# Patient Record
Sex: FEMALE | NOT HISPANIC OR LATINO | Employment: FULL TIME
[De-identification: names, ages, dates, MRNs, and addresses within clinical notes are randomized per-mention and may not be internally consistent; named-entity substitution may affect disease eponyms.]

---

## 2017-08-05 ENCOUNTER — HEALTH MAINTENANCE LETTER (OUTPATIENT)
Age: 47
End: 2017-08-05

## 2018-08-12 ENCOUNTER — HEALTH MAINTENANCE LETTER (OUTPATIENT)
Age: 48
End: 2018-08-12

## 2019-07-17 ENCOUNTER — RECORDS - HEALTHEAST (OUTPATIENT)
Dept: ADMINISTRATIVE | Facility: OTHER | Age: 49
End: 2019-07-17

## 2019-08-01 ENCOUNTER — RECORDS - HEALTHEAST (OUTPATIENT)
Dept: ADMINISTRATIVE | Facility: OTHER | Age: 49
End: 2019-08-01

## 2019-08-13 ENCOUNTER — RECORDS - HEALTHEAST (OUTPATIENT)
Dept: RADIOLOGY | Facility: CLINIC | Age: 49
End: 2019-08-13

## 2019-08-13 ENCOUNTER — RECORDS - HEALTHEAST (OUTPATIENT)
Dept: ADMINISTRATIVE | Facility: OTHER | Age: 49
End: 2019-08-13

## 2019-08-27 ENCOUNTER — HOSPITAL ENCOUNTER (OUTPATIENT)
Dept: SURGERY | Facility: CLINIC | Age: 49
Discharge: HOME | End: 2019-08-27
Attending: SPECIALIST

## 2019-08-27 ENCOUNTER — COMMUNICATION - HEALTHEAST (OUTPATIENT)
Dept: SURGERY | Facility: CLINIC | Age: 49
End: 2019-08-27

## 2019-08-28 ENCOUNTER — COMMUNICATION - HEALTHEAST (OUTPATIENT)
Dept: SURGERY | Facility: CLINIC | Age: 49
End: 2019-08-28

## 2019-08-30 ENCOUNTER — COMMUNICATION - HEALTHEAST (OUTPATIENT)
Dept: SURGERY | Facility: CLINIC | Age: 49
End: 2019-08-30

## 2019-09-03 ENCOUNTER — COMMUNICATION - HEALTHEAST (OUTPATIENT)
Dept: SURGERY | Facility: CLINIC | Age: 49
End: 2019-09-03

## 2019-09-11 ENCOUNTER — OFFICE VISIT (OUTPATIENT)
Dept: FAMILY MEDICINE | Facility: CLINIC | Age: 49
End: 2019-09-11
Payer: COMMERCIAL

## 2019-09-11 VITALS
WEIGHT: 189 LBS | BODY MASS INDEX: 25.6 KG/M2 | TEMPERATURE: 99 F | SYSTOLIC BLOOD PRESSURE: 124 MMHG | HEART RATE: 83 BPM | HEIGHT: 72 IN | DIASTOLIC BLOOD PRESSURE: 84 MMHG | OXYGEN SATURATION: 98 % | RESPIRATION RATE: 16 BRPM

## 2019-09-11 DIAGNOSIS — F34.1 DYSTHYMIA: ICD-10-CM

## 2019-09-11 DIAGNOSIS — F41.1 GAD (GENERALIZED ANXIETY DISORDER): Primary | ICD-10-CM

## 2019-09-11 PROCEDURE — 99214 OFFICE O/P EST MOD 30 MIN: CPT | Performed by: PHYSICIAN ASSISTANT

## 2019-09-11 RX ORDER — BUPROPION HYDROCHLORIDE 300 MG/1
300 TABLET ORAL DAILY
COMMUNITY
Start: 2019-08-16

## 2019-09-11 RX ORDER — FLUOXETINE 10 MG/1
CAPSULE ORAL
Qty: 60 CAPSULE | Refills: 1 | Status: SHIPPED | OUTPATIENT
Start: 2019-09-11 | End: 2019-10-17

## 2019-09-11 RX ORDER — LIOTHYRONINE SODIUM 5 UG/1
1 TABLET ORAL DAILY
COMMUNITY
Start: 2019-08-16

## 2019-09-11 RX ORDER — LEVOTHYROXINE SODIUM 112 UG/1
112 TABLET ORAL
COMMUNITY
Start: 2019-01-17

## 2019-09-11 ASSESSMENT — ANXIETY QUESTIONNAIRES
7. FEELING AFRAID AS IF SOMETHING AWFUL MIGHT HAPPEN: MORE THAN HALF THE DAYS
GAD7 TOTAL SCORE: 10
1. FEELING NERVOUS, ANXIOUS, OR ON EDGE: NEARLY EVERY DAY
2. NOT BEING ABLE TO STOP OR CONTROL WORRYING: SEVERAL DAYS
5. BEING SO RESTLESS THAT IT IS HARD TO SIT STILL: NOT AT ALL
6. BECOMING EASILY ANNOYED OR IRRITABLE: SEVERAL DAYS
3. WORRYING TOO MUCH ABOUT DIFFERENT THINGS: SEVERAL DAYS
IF YOU CHECKED OFF ANY PROBLEMS ON THIS QUESTIONNAIRE, HOW DIFFICULT HAVE THESE PROBLEMS MADE IT FOR YOU TO DO YOUR WORK, TAKE CARE OF THINGS AT HOME, OR GET ALONG WITH OTHER PEOPLE: VERY DIFFICULT

## 2019-09-11 ASSESSMENT — PATIENT HEALTH QUESTIONNAIRE - PHQ9
SUM OF ALL RESPONSES TO PHQ QUESTIONS 1-9: 10
5. POOR APPETITE OR OVEREATING: MORE THAN HALF THE DAYS

## 2019-09-11 ASSESSMENT — MIFFLIN-ST. JEOR: SCORE: 1608.17

## 2019-09-11 NOTE — PROGRESS NOTES
Subjective     Sheryl Cee is a 49 year old female who presents to clinic today for the following health issues:    HPI   Abnormal Mood Symptoms  Onset: 1.5 mos    Description:   Depression: YES  Anxiety: YES    Accompanying Signs & Symptoms:  Still participating in activities that you used to enjoy: no  Fatigue: yes  Irritability: no   Difficulty concentrating: YES  Changes in appetite: no   Problems with sleep: YES  Heart racing/beating fast : no   Thoughts of hurting yourself or others: none    History:   Recent stress: YES - recent diagnosis of breast cancer  Prior depression hospitalization: None  Family history of depression: YES  Family history of anxiety: YES    Precipitating factors:   Alcohol/drug use: no     Alleviating factors:  None    Therapies Tried and outcome: None      Patient Active Problem List   Diagnosis     Hypothyroidism     DEPRESSION     Tinnitus     CARDIOVASCULAR SCREENING; LDL GOAL LESS THAN 160     Vitamin D insufficiency     Irregular menses     Weight gain     Past Surgical History:   Procedure Laterality Date     SURGICAL HISTORY OF -   1999    Extraction of Elizabeth Teeth       Social History     Tobacco Use     Smoking status: Never Smoker     Smokeless tobacco: Never Used   Substance Use Topics     Alcohol use: Yes     Comment: social     Family History   Problem Relation Age of Onset     Breast Cancer Other      Diabetes Maternal Grandmother      Circulatory Maternal Grandfather         CHF     Cerebrovascular Disease Paternal Grandfather      Hypertension Brother         Stress related     C.A.D. No family hx of      Cancer - colorectal No family hx of      Prostate Cancer No family hx of          Current Outpatient Medications   Medication Sig Dispense Refill     buPROPion (WELLBUTRIN XL) 300 MG 24 hr tablet Take 300 mg by mouth daily       cholecalciferol (VITAMIN-D) 1000 UNIT capsule Take 1 capsule by mouth daily. 30 capsule 11     levothyroxine (SYNTHROID/LEVOTHROID) 112  "MCG tablet Take 112 mcg by mouth       liothyronine (CYTOMEL) 5 MCG tablet Take 1 tablet by mouth daily       cefUROXime (CEFTIN) 500 MG tablet Take 1 tablet (500 mg) by mouth 2 times daily (Patient not taking: Reported on 9/11/2019) 20 tablet 1     cholecalciferol 2000 units CAPS Take 2,000 Units by mouth       CITALOPRAM HYDROBROMIDE 10 MG PO TABS 1 TABLETS DAILY 30 0     guaiFENesin-codeine (ROBITUSSIN AC) 100-10 MG/5ML SOLN Take 10-15 mLs by mouth every 4 hours as needed for cough (Patient not taking: Reported on 9/11/2019) 240 mL 1     LEVOXYL 50 MCG OR TABS 1 TABLET DAILY       No Known Allergies  Recent Labs   Lab Test 10/06/15  0813 09/24/13  0709 02/13/12  1644   * 128  --    HDL 54 62  --    TRIG 119 86  --    CR  --   --  0.91   GFRESTIMATED  --   --  69   GFRESTBLACK  --   --  83   POTASSIUM  --   --  4.3   TSH 2.81  --  2.25      BP Readings from Last 3 Encounters:   09/11/19 124/84   10/02/15 128/72   03/31/14 121/75    Wt Readings from Last 3 Encounters:   09/11/19 85.7 kg (189 lb)   10/02/15 90.1 kg (198 lb 9.6 oz)   03/31/14 83.6 kg (184 lb 6.4 oz)                      Reviewed and updated as needed this visit by Provider         Review of Systems   ROS COMP: Constitutional, HEENT, cardiovascular, pulmonary, GI, , musculoskeletal, neuro, skin, endocrine and psych systems are negative, except as otherwise noted.      Objective    /84   Pulse 83   Temp 99  F (37.2  C) (Tympanic)   Resp 16   Ht 1.835 m (6' 0.24\")   Wt 85.7 kg (189 lb)   SpO2 98%   BMI 25.46 kg/m    Body mass index is 25.46 kg/m .  Physical Exam     Eye exam - right eye normal lid, conjunctiva, cornea, pupil and fundus, left eye normal lid, conjunctiva, cornea, pupil and fundus.  Thyroid not palpable, not enlarged, small left lobe nodules detected.  CHEST:chest clear to IPPA, no tachypnea, retractions or cyanosis and S1, S2 normal, no murmur, no gallop, rate regular.    Sheryl was seen today for anxiety and new " patient.    Diagnoses and all orders for this visit:    SHAISTA (generalized anxiety disorder)   FLUoxetine (PROZAC) 10 MG capsule; Take 1 capsule daily for 7 days, then increase to 2 capsules daily thereafter    Dysthymia   FLUoxetine (PROZAC) 10 MG capsule; Take 1 capsule daily for 7 days, then increase to 2 capsules daily thereafter    work on lifestyle modification  Recheck in 5 wks.

## 2019-09-12 ASSESSMENT — ANXIETY QUESTIONNAIRES: GAD7 TOTAL SCORE: 10

## 2019-10-15 DIAGNOSIS — F34.1 DYSTHYMIA: ICD-10-CM

## 2019-10-15 DIAGNOSIS — F41.1 GAD (GENERALIZED ANXIETY DISORDER): ICD-10-CM

## 2019-10-15 NOTE — TELEPHONE ENCOUNTER
Requested Prescriptions   Pending Prescriptions Disp Refills     FLUoxetine (PROZAC) 10 MG capsule 60 capsule 1     Sig: Take 1 capsule daily for 7 days, then increase to 2 capsules daily thereafter  Last Written Prescription Date:  9/11/19  Last Fill Quantity: 90,  # refills: 4   Last office visit: 9/11/2019 with prescribing provider:  XIN Ivan   Future Office Visit:         There is no refill protocol information for this order

## 2019-10-15 NOTE — LETTER
October 22, 2019        Sheryl Cee  [address]  [place] [address]      Dear Sheryl,    Your medication has been approved for FLUoxetine (PROZAC) 10 MG capsule for one month only.    However, you are due for a follow up appointment for further refills. Please schedule this visit at your earliest convenience.    Thank you.    Keron Ivan PA-C/rm

## 2019-10-17 NOTE — TELEPHONE ENCOUNTER
Routing refill request to provider for review/approval because:  Medication is reported/historical    Patient states that she has been taking Fluoxetine 20 mg (1 tablet daily) and this has been effective. No side effects reported.     Current Medication list shows only Fluoxetine 10 mg written (with directions to increase to 2 tablets to =20 mg).     She has 5 pills left today and uses a Mail Order pharmacy.    According to progress note, Keron Ivan PA-C wanted patient to follow up mid-October. His next opening is 10/24/19. Patient states that she is having a mastectomy that day & will schedule a follow up a few weeks after her surgery.     Tatiana Gallagher RN BSN

## 2019-12-27 DIAGNOSIS — F41.1 GAD (GENERALIZED ANXIETY DISORDER): ICD-10-CM

## 2019-12-27 DIAGNOSIS — F34.1 DYSTHYMIA: ICD-10-CM

## 2019-12-27 NOTE — TELEPHONE ENCOUNTER
"Requested Prescriptions   Pending Prescriptions Disp Refills     FLUoxetine (PROZAC) 20 MG capsule [Pharmacy Med Name: FLUOXETINE HCL CAPS 20MG] 90 capsule 4     Sig: TAKE 1 CAPSULE DAILY  Last Written Prescription Date:  10/17/19  Last Fill Quantity: 90,  # refills: 4   Last office visit: 9/11/2019 with prescribing provider:  XIN Ivan   Future Office Visit:         SSRIs Protocol Failed - 12/27/2019  8:40 AM        Failed - PHQ-9 score less than 5 in past 6 months     Please review last PHQ-9 score.           Passed - Medication is active on med list        Passed - Patient is age 18 or older        Passed - No active pregnancy on record        Passed - No positive pregnancy test in last 12 months        Passed - Recent (6 mo) or future (30 days) visit within the authorizing provider's specialty     Patient had office visit in the last 6 months or has a visit in the next 30 days with authorizing provider or within the authorizing provider's specialty.  See \"Patient Info\" tab in inbasket, or \"Choose Columns\" in Meds & Orders section of the refill encounter.            "

## 2019-12-29 NOTE — TELEPHONE ENCOUNTER
PHQ-9 SCORE 9/11/2019   PHQ-9 Total Score 10     Last OV with Keron: 9/11/19 with advised F/U in 5 weeks which patient did not complete.     Marci Coelho, RN, BSN

## 2020-02-16 ENCOUNTER — HEALTH MAINTENANCE LETTER (OUTPATIENT)
Age: 50
End: 2020-02-16

## 2020-11-13 ENCOUNTER — OFFICE VISIT (OUTPATIENT)
Dept: ORTHOPEDICS | Facility: CLINIC | Age: 50
End: 2020-11-13
Payer: COMMERCIAL

## 2020-11-13 ENCOUNTER — ANCILLARY PROCEDURE (OUTPATIENT)
Dept: GENERAL RADIOLOGY | Facility: CLINIC | Age: 50
End: 2020-11-13
Attending: PEDIATRICS
Payer: COMMERCIAL

## 2020-11-13 VITALS
SYSTOLIC BLOOD PRESSURE: 132 MMHG | WEIGHT: 175 LBS | HEIGHT: 72 IN | BODY MASS INDEX: 23.7 KG/M2 | DIASTOLIC BLOOD PRESSURE: 86 MMHG

## 2020-11-13 DIAGNOSIS — M25.562 CHRONIC PAIN OF LEFT KNEE: ICD-10-CM

## 2020-11-13 DIAGNOSIS — G89.29 CHRONIC PAIN OF LEFT KNEE: ICD-10-CM

## 2020-11-13 DIAGNOSIS — M25.562 CHRONIC PAIN OF LEFT KNEE: Primary | ICD-10-CM

## 2020-11-13 DIAGNOSIS — G89.29 CHRONIC PAIN OF LEFT KNEE: Primary | ICD-10-CM

## 2020-11-13 PROCEDURE — 99204 OFFICE O/P NEW MOD 45 MIN: CPT | Performed by: PEDIATRICS

## 2020-11-13 PROCEDURE — 73562 X-RAY EXAM OF KNEE 3: CPT | Mod: LT | Performed by: RADIOLOGY

## 2020-11-13 RX ORDER — ANASTROZOLE 1 MG/1
1 TABLET ORAL
COMMUNITY
Start: 2020-04-02

## 2020-11-13 ASSESSMENT — MIFFLIN-ST. JEOR: SCORE: 1535.79

## 2020-11-13 NOTE — PROGRESS NOTES
Sports Medicine Clinic Visit    PCP: Michele Valentine    Sheryl Cee is a 50 year old female who is seen  as a self referral presenting with left knee pain.    Injury: None  **  Symptoms intermittent; generally after more activity, e.g., stairs; sometimes if stepping wrong.   Gets some anterior soreness, some swelling.  Feels like gives way at times.   Feels like still able to be active.        Location of Pain: left anterior knee - around patella  Duration of Pain: 2 year(s), pain comes and goes  Rating of Pain at worst: 7/10  Rating of Pain Currently: 1/10  Pain is better with: Ice, Rest and Elevation  Pain is worse with: going up and down stairs, prolonged walking, running, carrying heavy items  Additional Features: cracking, occasional feeling of instability, occasional swelling  Other treatments so far consists of: Ice, Heat, Ibuprofen, Rest and Elevation  Prior History of related problems: none    Social History: works in office administrative support    Review of Systems  Skin: no+ bruising, + swelling  Musculoskeletal: as above  Neurologic: no numbness, paresthesias  Remainder of review of systems is negative including constitutional, CV, pulmonary, GI, except as noted in HPI or medical history.    The patient's past medical and surgical history, social history, family history, problem list, and medication list has been reviewed and is as noted above and in chart.    Objective  /86   Ht 1.829 m (6')   Wt 79.4 kg (175 lb)   BMI 23.73 kg/m      GENERAL APPEARANCE: healthy, alert and no distress   GAIT: NORMAL  SKIN: no suspicious lesions or rashes  NEURO: Normal strength and tone, mentation intact and speech normal  PSYCH:  mentation appears normal and affect normal/bright  HEENT: no scleral icterus  CV: distal perfusion intact  RESP: nonlabored breathing          Left Knee exam    ROM:      Full active and passive ROM with flexion and extension    Patellar Motion:      Crepitus noted in  the patellofemoral joint    Tender: none focal    Non Tender:       medial patellar border        lateral patellar border        medial joint line        lateral joint line        infrapatellar tendon        tibial tubercle       No pain with patellar translation    Special Tests:      neg (-) Tiki       neg (-) Lachman       neg (-) anterior drawer       neg (-) posterior drawer       neg (-) varus       neg (-) valgus       no pain with forced extension    Evaluation of ipsilateral kinetic chain:        Increased anterior knee motion with mini squat, single leg squat  Crepitus left knee with above motions  Some pain left with above motions    Radiology  Visualized radiographs as noted below, and reviewed the images with the patient; if the report was available at the time of the visit, the report was reviewed as well.  No acute abnormality. Joint spaces appear fairly well preserved.      Recent Results (from the past 24 hour(s))   XR Knee Standing AP Bilat Joshua Bilat Lat Left    Narrative    KNEE STANDING AP BILATERAL SUNRISE BILATERAL LATERAL LEFT 11/13/2020  11:21 AM     HISTORY: Chronic pain of left knee.    COMPARISON: None.      Impression    IMPRESSION: Three views of the left knee show no bony or soft tissue  abnormality. No joint space effusion. Two views of the right knee are  included and are unremarkable.         Assessment:  1. Chronic pain of left knee        Plan:  Discussed the assessment with the patient.  See patient instructions. Offered PT, even just one visit. She primarily prefers to monitor for now, though can contact her relative who is a PT.  Follow up: will leave open ended.  Questions answered. Discussed signs and symptoms that may indicate more serious issues; the patient was instructed to seek appropriate care if noted. Sheryl indicates understanding of these issues and agrees with the plan.      Torres Rader DO, CAQ          Patient Instructions   Left knee pain is from the  patellofemoral compartment, patellofemoral pain.  With the noise (crepitus), this is consistent with underlying early degenerative change in the joint, also called chondromalacia.  May do icing, heat, over-the-counter medication as needed.  We discussed potential for physical therapy to work on knee mechanics and strength.  Contact clinic if interested in therapy referral, otherwise you may certainly contact your cousin as well.  No additional imaging required currently, though we did briefly discuss consideration of an MRI if ongoing issues.  We also discussed potential for use of anti-inflammatory medication or even an injection, if the knee really flares again.  Hold with these things for now.  Otherwise, activities are as tolerated, and you may contact clinic if any questions/concerns.      If you have any further questions for your physician or physician s care team you can call [phone] and use option 3 to leave a voice message. Calls received during business hours will be returned same day.            Disclaimer: This note consists of symbols derived from keyboarding, dictation and/or voice recognition software. As a result, there may be errors in the script that have gone undetected. Please consider this when interpreting information found in this chart.

## 2020-11-13 NOTE — LETTER
11/13/2020         RE: Sheryl Cee  [address]  [place] [address]        Dear Colleague,    Thank you for referring your patient, Sheryl Cee, to the [facility] [place]. Please see a copy of my visit note below.    Sports Medicine Clinic Visit    PCP: Meme, [facility] [place]    Sheryl Cee is a 50 year old female who is seen  as a self referral presenting with left knee pain.    Injury: None  **  Symptoms intermittent; generally after more activity, e.g., stairs; sometimes if stepping wrong.   Gets some anterior soreness, some swelling.  Feels like gives way at times.   Feels like still able to be active.        Location of Pain: left anterior knee - around patella  Duration of Pain: 2 year(s), pain comes and goes  Rating of Pain at worst: 7/10  Rating of Pain Currently: 1/10  Pain is better with: Ice, Rest and Elevation  Pain is worse with: going up and down stairs, prolonged walking, running, carrying heavy items  Additional Features: cracking, occasional feeling of instability, occasional swelling  Other treatments so far consists of: Ice, Heat, Ibuprofen, Rest and Elevation  Prior History of related problems: none    Social History: works in office administrative support    Review of Systems  Skin: no+ bruising, + swelling  Musculoskeletal: as above  Neurologic: no numbness, paresthesias  Remainder of review of systems is negative including constitutional, CV, pulmonary, GI, except as noted in HPI or medical history.    The patient's past medical and surgical history, social history, family history, problem list, and medication list has been reviewed and is as noted above and in chart.    Objective  /86   Ht 1.829 m (6')   Wt 79.4 kg (175 lb)   BMI 23.73 kg/m      GENERAL APPEARANCE: healthy, alert and no distress   GAIT: NORMAL  SKIN: no suspicious lesions or rashes  NEURO: Normal strength and tone, mentation intact and speech normal  PSYCH:   mentation appears normal and affect normal/bright  HEENT: no scleral icterus  CV: distal perfusion intact  RESP: nonlabored breathing          Left Knee exam    ROM:      Full active and passive ROM with flexion and extension    Patellar Motion:      Crepitus noted in the patellofemoral joint    Tender: none focal    Non Tender:       medial patellar border        lateral patellar border        medial joint line        lateral joint line        infrapatellar tendon        tibial tubercle       No pain with patellar translation    Special Tests:      neg (-) Tiki       neg (-) Lachman       neg (-) anterior drawer       neg (-) posterior drawer       neg (-) varus       neg (-) valgus       no pain with forced extension    Evaluation of ipsilateral kinetic chain:        Increased anterior knee motion with mini squat, single leg squat  Crepitus left knee with above motions  Some pain left with above motions    Radiology  Visualized radiographs as noted below, and reviewed the images with the patient; if the report was available at the time of the visit, the report was reviewed as well.  No acute abnormality. Joint spaces appear fairly well preserved.      Recent Results (from the past 24 hour(s))   XR Knee Standing AP Bilat Sylvarena Bilat Lat Left    Narrative    KNEE STANDING AP BILATERAL SUNRISE BILATERAL LATERAL LEFT 11/13/2020  11:21 AM     HISTORY: Chronic pain of left knee.    COMPARISON: None.      Impression    IMPRESSION: Three views of the left knee show no bony or soft tissue  abnormality. No joint space effusion. Two views of the right knee are  included and are unremarkable.         Assessment:  1. Chronic pain of left knee        Plan:  Discussed the assessment with the patient.  See patient instructions. Offered PT, even just one visit. She primarily prefers to monitor for now, though can contact her relative who is a PT.  Follow up: will leave open ended.  Questions answered. Discussed signs and  symptoms that may indicate more serious issues; the patient was instructed to seek appropriate care if noted. Sheryl indicates understanding of these issues and agrees with the plan.      Torres Rader DO, CAQ          Patient Instructions   Left knee pain is from the patellofemoral compartment, patellofemoral pain.  With the noise (crepitus), this is consistent with underlying early degenerative change in the joint, also called chondromalacia.  May do icing, heat, over-the-counter medication as needed.  We discussed potential for physical therapy to work on knee mechanics and strength.  Contact clinic if interested in therapy referral, otherwise you may certainly contact your cousin as well.  No additional imaging required currently, though we did briefly discuss consideration of an MRI if ongoing issues.  We also discussed potential for use of anti-inflammatory medication or even an injection, if the knee really flares again.  Hold with these things for now.  Otherwise, activities are as tolerated, and you may contact clinic if any questions/concerns.      If you have any further questions for your physician or physician s care team you can call [phone] and use option 3 to leave a voice message. Calls received during business hours will be returned same day.            Disclaimer: This note consists of symbols derived from keyboarding, dictation and/or voice recognition software. As a result, there may be errors in the script that have gone undetected. Please consider this when interpreting information found in this chart.            Again, thank you for allowing me to participate in the care of your patient.        Sincerely,        Torres Rader DO

## 2020-11-13 NOTE — PATIENT INSTRUCTIONS
Left knee pain is from the patellofemoral compartment, patellofemoral pain.  With the noise (crepitus), this is consistent with underlying early degenerative change in the joint, also called chondromalacia.  May do icing, heat, over-the-counter medication as needed.  We discussed potential for physical therapy to work on knee mechanics and strength.  Contact clinic if interested in therapy referral, otherwise you may certainly contact your cousin as well.  No additional imaging required currently, though we did briefly discuss consideration of an MRI if ongoing issues.  We also discussed potential for use of anti-inflammatory medication or even an injection, if the knee really flares again.  Hold with these things for now.  Otherwise, activities are as tolerated, and you may contact clinic if any questions/concerns.      If you have any further questions for your physician or physician s care team you can call [phone] and use option 3 to leave a voice message. Calls received during business hours will be returned same day.

## 2020-11-22 ENCOUNTER — HEALTH MAINTENANCE LETTER (OUTPATIENT)
Age: 50
End: 2020-11-22

## 2021-02-13 ENCOUNTER — HEALTH MAINTENANCE LETTER (OUTPATIENT)
Age: 51
End: 2021-02-13

## 2021-03-04 DIAGNOSIS — F34.1 DYSTHYMIA: ICD-10-CM

## 2021-03-04 DIAGNOSIS — F41.1 GAD (GENERALIZED ANXIETY DISORDER): ICD-10-CM

## 2021-03-05 NOTE — TELEPHONE ENCOUNTER
Routing refill request to provider for review/approval because:  Patient needs to be seen because it has been more than 1 year since last office visit.  Pended with reminder appt due

## 2021-03-06 NOTE — TELEPHONE ENCOUNTER
tony is due for a visit with me. Schedule her for a virtual (video or phone based on patient preference. Always promote a video visit first) visit with me.

## 2021-04-04 ENCOUNTER — HEALTH MAINTENANCE LETTER (OUTPATIENT)
Age: 51
End: 2021-04-04

## 2021-05-31 NOTE — TELEPHONE ENCOUNTER
Will forward message to Susan Lombardi, RN to review re-scheduling per Dr. Pinto's recommendations. Pt also had a scheduled breast cancer consult at [facility] with Dr. Almanza today.

## 2021-05-31 NOTE — TELEPHONE ENCOUNTER
Attempted to call patient to help her reschedule her appointment with Dr. Pinto, if that is what she is wanting to do.  Called both cell and home phone.  Cell VM box is full, home phone number has been disconnected.

## 2021-05-31 NOTE — TELEPHONE ENCOUNTER
Sheryl Murdock called and asked to put back on Dr. Pinto's schedule for a second surgical opinion.  She is scheduled now to see Dr. Pinto on Thursday, 9-19-19 at 1100/1040 check in.      Of note, patient's screening mammogram was done in July 2019 at [facility].  From there she went to [facility] for diagnostic work up.  She went to [facility] in [facility] and met with Dr. Isabel Irizarry for surgical consult, as well as Dr. Matias Almanza for Med Onc, Dr. Burk for reconstruction consult. She also had a breast MRI with MRI guided biopsy done on 8-28-19 that came back + for DCIS.

## 2021-05-31 NOTE — TELEPHONE ENCOUNTER
Pts Care Provider: Dr. Pinto    Caller: Sheryl    Phone Number: [phone]  OK to leave message: Yes    Reason for Call: Pt had to cancel her appointment today and was unable to reschedule at the moment. I did inform her that Dr. Pinto was booking out until 9/17/19. She stated that this date was to far our and would like to request for a sooner appointment other then today. Please call the patient and discuss.

## 2021-05-31 NOTE — TELEPHONE ENCOUNTER
I was able to reach Sheryl to see if she wished to reschedule her consult with Dr. Pinto regarding her newly diagnosed breast cancer.  Sheryl has been seen in the [facility] system, but she said she still may want other opinions.  I have given her my number to call if she decides she wishes to see Dr. Pinto for a surgical consult.

## 2021-09-19 ENCOUNTER — HEALTH MAINTENANCE LETTER (OUTPATIENT)
Age: 51
End: 2021-09-19

## 2022-03-06 ENCOUNTER — HEALTH MAINTENANCE LETTER (OUTPATIENT)
Age: 52
End: 2022-03-06

## 2022-05-01 ENCOUNTER — HEALTH MAINTENANCE LETTER (OUTPATIENT)
Age: 52
End: 2022-05-01

## 2022-11-20 ENCOUNTER — HEALTH MAINTENANCE LETTER (OUTPATIENT)
Age: 52
End: 2022-11-20

## 2023-04-16 ENCOUNTER — HEALTH MAINTENANCE LETTER (OUTPATIENT)
Age: 53
End: 2023-04-16

## 2024-04-13 ENCOUNTER — HEALTH MAINTENANCE LETTER (OUTPATIENT)
Age: 54
End: 2024-04-13

## 2024-06-22 ENCOUNTER — HEALTH MAINTENANCE LETTER (OUTPATIENT)
Age: 54
End: 2024-06-22